# Patient Record
Sex: FEMALE | Race: ASIAN | NOT HISPANIC OR LATINO | URBAN - METROPOLITAN AREA
[De-identification: names, ages, dates, MRNs, and addresses within clinical notes are randomized per-mention and may not be internally consistent; named-entity substitution may affect disease eponyms.]

---

## 2017-07-25 VITALS
TEMPERATURE: 98 F | HEART RATE: 82 BPM | RESPIRATION RATE: 16 BRPM | WEIGHT: 149.69 LBS | DIASTOLIC BLOOD PRESSURE: 66 MMHG | OXYGEN SATURATION: 99 % | SYSTOLIC BLOOD PRESSURE: 130 MMHG | HEIGHT: 65 IN

## 2017-07-25 NOTE — PATIENT PROFILE ADULT. - PSH
H/O total knee replacement  2001 right  Surgery, elective  Cranial hemmatoma drained 1994 H/O total knee replacement  2001 right  Surgery, elective  Cranial hemmatoma drained 1994  s/p accident

## 2017-07-26 ENCOUNTER — INPATIENT (INPATIENT)
Facility: HOSPITAL | Age: 67
LOS: 4 days | Discharge: HOME HEALTH | DRG: 470 | End: 2017-07-31
Attending: SPECIALIST | Admitting: SPECIALIST
Payer: MEDICARE

## 2017-07-26 DIAGNOSIS — Z41.9 ENCOUNTER FOR PROCEDURE FOR PURPOSES OTHER THAN REMEDYING HEALTH STATE, UNSPECIFIED: Chronic | ICD-10-CM

## 2017-07-26 DIAGNOSIS — Z96.659 PRESENCE OF UNSPECIFIED ARTIFICIAL KNEE JOINT: Chronic | ICD-10-CM

## 2017-07-26 DIAGNOSIS — M19.90 UNSPECIFIED OSTEOARTHRITIS, UNSPECIFIED SITE: ICD-10-CM

## 2017-07-26 LAB
ANION GAP SERPL CALC-SCNC: 11 MMOL/L — SIGNIFICANT CHANGE UP (ref 5–17)
BASE EXCESS BLDA CALC-SCNC: 2.2 MMOL/L — SIGNIFICANT CHANGE UP (ref -2–3)
BUN SERPL-MCNC: 10 MG/DL — SIGNIFICANT CHANGE UP (ref 7–23)
CA-I BLDA-SCNC: 1.17 MMOL/L — SIGNIFICANT CHANGE UP (ref 1.12–1.3)
CALCIUM SERPL-MCNC: 9 MG/DL — SIGNIFICANT CHANGE UP (ref 8.4–10.5)
CHLORIDE SERPL-SCNC: 103 MMOL/L — SIGNIFICANT CHANGE UP (ref 96–108)
CO2 SERPL-SCNC: 25 MMOL/L — SIGNIFICANT CHANGE UP (ref 22–31)
COHGB MFR BLDA: 0.3 % — SIGNIFICANT CHANGE UP
CREAT SERPL-MCNC: 0.5 MG/DL — SIGNIFICANT CHANGE UP (ref 0.5–1.3)
GAS PNL BLDA: SIGNIFICANT CHANGE UP
GLUCOSE SERPL-MCNC: 123 MG/DL — HIGH (ref 70–99)
HCO3 BLDA-SCNC: 28 MMOL/L — SIGNIFICANT CHANGE UP (ref 21–28)
HCT VFR BLD CALC: 33 % — LOW (ref 34.5–45)
HGB BLD-MCNC: 10.7 G/DL — LOW (ref 11.5–15.5)
HGB BLDA-MCNC: 10.6 G/DL — LOW (ref 11.5–15.5)
MCHC RBC-ENTMCNC: 30.8 PG — SIGNIFICANT CHANGE UP (ref 27–34)
MCHC RBC-ENTMCNC: 32.4 G/DL — SIGNIFICANT CHANGE UP (ref 32–36)
MCV RBC AUTO: 95.1 FL — SIGNIFICANT CHANGE UP (ref 80–100)
O2 CT VFR BLDA CALC: SIGNIFICANT CHANGE UP (ref 15–23)
OXYHGB MFR BLDA: 99 % — SIGNIFICANT CHANGE UP (ref 94–100)
PCO2 BLDA: 48 MMHG — HIGH (ref 32–45)
PH BLDA: 7.38 — SIGNIFICANT CHANGE UP (ref 7.35–7.45)
PLATELET # BLD AUTO: 208 K/UL — SIGNIFICANT CHANGE UP (ref 150–400)
PO2 BLDA: 244 MMHG — HIGH (ref 83–108)
POTASSIUM BLDA-SCNC: 3.6 MMOL/L — SIGNIFICANT CHANGE UP (ref 3.5–4.9)
POTASSIUM SERPL-MCNC: 4 MMOL/L — SIGNIFICANT CHANGE UP (ref 3.5–5.3)
POTASSIUM SERPL-SCNC: 4 MMOL/L — SIGNIFICANT CHANGE UP (ref 3.5–5.3)
RBC # BLD: 3.47 M/UL — LOW (ref 3.8–5.2)
RBC # FLD: 12.2 % — SIGNIFICANT CHANGE UP (ref 10.3–16.9)
SAO2 % BLDA: 100 % — SIGNIFICANT CHANGE UP (ref 95–100)
SODIUM BLDA-SCNC: 139 MMOL/L — SIGNIFICANT CHANGE UP (ref 138–146)
SODIUM SERPL-SCNC: 139 MMOL/L — SIGNIFICANT CHANGE UP (ref 135–145)
WBC # BLD: 10.2 K/UL — SIGNIFICANT CHANGE UP (ref 3.8–10.5)
WBC # FLD AUTO: 10.2 K/UL — SIGNIFICANT CHANGE UP (ref 3.8–10.5)

## 2017-07-26 PROCEDURE — 73502 X-RAY EXAM HIP UNI 2-3 VIEWS: CPT | Mod: 26,LT

## 2017-07-26 PROCEDURE — 72170 X-RAY EXAM OF PELVIS: CPT | Mod: 26

## 2017-07-26 RX ORDER — MORPHINE SULFATE 50 MG/1
4 CAPSULE, EXTENDED RELEASE ORAL ONCE
Qty: 0 | Refills: 0 | Status: DISCONTINUED | OUTPATIENT
Start: 2017-07-26 | End: 2017-07-26

## 2017-07-26 RX ORDER — IRBESARTAN 75 MG/1
1 TABLET ORAL
Qty: 0 | Refills: 0 | COMMUNITY

## 2017-07-26 RX ORDER — ACETAMINOPHEN 500 MG
650 TABLET ORAL EVERY 6 HOURS
Qty: 0 | Refills: 0 | Status: DISCONTINUED | OUTPATIENT
Start: 2017-07-26 | End: 2017-07-26

## 2017-07-26 RX ORDER — ACETAMINOPHEN 500 MG
650 TABLET ORAL EVERY 6 HOURS
Qty: 0 | Refills: 0 | Status: DISCONTINUED | OUTPATIENT
Start: 2017-07-26 | End: 2017-07-31

## 2017-07-26 RX ORDER — MORPHINE SULFATE 50 MG/1
2 CAPSULE, EXTENDED RELEASE ORAL
Qty: 0 | Refills: 0 | Status: DISCONTINUED | OUTPATIENT
Start: 2017-07-26 | End: 2017-07-26

## 2017-07-26 RX ORDER — POLYETHYLENE GLYCOL 3350 17 G/17G
17 POWDER, FOR SOLUTION ORAL DAILY
Qty: 0 | Refills: 0 | Status: DISCONTINUED | OUTPATIENT
Start: 2017-07-26 | End: 2017-07-26

## 2017-07-26 RX ORDER — FOLIC ACID 0.8 MG
1 TABLET ORAL DAILY
Qty: 0 | Refills: 0 | Status: DISCONTINUED | OUTPATIENT
Start: 2017-07-26 | End: 2017-07-26

## 2017-07-26 RX ORDER — OXYCODONE HYDROCHLORIDE 5 MG/1
10 TABLET ORAL EVERY 4 HOURS
Qty: 0 | Refills: 0 | Status: DISCONTINUED | OUTPATIENT
Start: 2017-07-26 | End: 2017-07-26

## 2017-07-26 RX ORDER — MAGNESIUM HYDROXIDE 400 MG/1
30 TABLET, CHEWABLE ORAL DAILY
Qty: 0 | Refills: 0 | Status: DISCONTINUED | OUTPATIENT
Start: 2017-07-26 | End: 2017-07-26

## 2017-07-26 RX ORDER — FOLIC ACID 0.8 MG
1 TABLET ORAL DAILY
Qty: 0 | Refills: 0 | Status: DISCONTINUED | OUTPATIENT
Start: 2017-07-26 | End: 2017-07-31

## 2017-07-26 RX ORDER — ATORVASTATIN CALCIUM 80 MG/1
10 TABLET, FILM COATED ORAL AT BEDTIME
Qty: 0 | Refills: 0 | Status: DISCONTINUED | OUTPATIENT
Start: 2017-07-26 | End: 2017-07-26

## 2017-07-26 RX ORDER — SODIUM CHLORIDE 9 MG/ML
1000 INJECTION, SOLUTION INTRAVENOUS
Qty: 0 | Refills: 0 | Status: DISCONTINUED | OUTPATIENT
Start: 2017-07-26 | End: 2017-07-31

## 2017-07-26 RX ORDER — ASPIRIN/CALCIUM CARB/MAGNESIUM 324 MG
325 TABLET ORAL
Qty: 0 | Refills: 0 | Status: DISCONTINUED | OUTPATIENT
Start: 2017-07-26 | End: 2017-07-26

## 2017-07-26 RX ORDER — MAGNESIUM HYDROXIDE 400 MG/1
30 TABLET, CHEWABLE ORAL DAILY
Qty: 0 | Refills: 0 | Status: DISCONTINUED | OUTPATIENT
Start: 2017-07-26 | End: 2017-07-31

## 2017-07-26 RX ORDER — CEFAZOLIN SODIUM 1 G
1000 VIAL (EA) INJECTION EVERY 8 HOURS
Qty: 0 | Refills: 0 | Status: COMPLETED | OUTPATIENT
Start: 2017-07-26 | End: 2017-07-27

## 2017-07-26 RX ORDER — SODIUM CHLORIDE 9 MG/ML
1000 INJECTION, SOLUTION INTRAVENOUS
Qty: 0 | Refills: 0 | Status: DISCONTINUED | OUTPATIENT
Start: 2017-07-26 | End: 2017-07-26

## 2017-07-26 RX ORDER — ASPIRIN/CALCIUM CARB/MAGNESIUM 324 MG
325 TABLET ORAL
Qty: 0 | Refills: 0 | Status: DISCONTINUED | OUTPATIENT
Start: 2017-07-26 | End: 2017-07-31

## 2017-07-26 RX ORDER — PANTOPRAZOLE SODIUM 20 MG/1
40 TABLET, DELAYED RELEASE ORAL DAILY
Qty: 0 | Refills: 0 | Status: DISCONTINUED | OUTPATIENT
Start: 2017-07-26 | End: 2017-07-26

## 2017-07-26 RX ORDER — DOCUSATE SODIUM 100 MG
100 CAPSULE ORAL THREE TIMES A DAY
Qty: 0 | Refills: 0 | Status: DISCONTINUED | OUTPATIENT
Start: 2017-07-26 | End: 2017-07-26

## 2017-07-26 RX ORDER — ATORVASTATIN CALCIUM 80 MG/1
10 TABLET, FILM COATED ORAL AT BEDTIME
Qty: 0 | Refills: 0 | Status: DISCONTINUED | OUTPATIENT
Start: 2017-07-26 | End: 2017-07-31

## 2017-07-26 RX ORDER — FERROUS SULFATE 325(65) MG
325 TABLET ORAL
Qty: 0 | Refills: 0 | Status: DISCONTINUED | OUTPATIENT
Start: 2017-07-26 | End: 2017-07-31

## 2017-07-26 RX ORDER — OXYCODONE HYDROCHLORIDE 5 MG/1
10 TABLET ORAL EVERY 6 HOURS
Qty: 0 | Refills: 0 | Status: DISCONTINUED | OUTPATIENT
Start: 2017-07-26 | End: 2017-07-31

## 2017-07-26 RX ORDER — LOSARTAN POTASSIUM 100 MG/1
100 TABLET, FILM COATED ORAL DAILY
Qty: 0 | Refills: 0 | Status: DISCONTINUED | OUTPATIENT
Start: 2017-07-26 | End: 2017-07-31

## 2017-07-26 RX ORDER — FAMOTIDINE 10 MG/ML
20 INJECTION INTRAVENOUS EVERY 12 HOURS
Qty: 0 | Refills: 0 | Status: DISCONTINUED | OUTPATIENT
Start: 2017-07-26 | End: 2017-07-31

## 2017-07-26 RX ORDER — FERROUS SULFATE 325(65) MG
325 TABLET ORAL
Qty: 0 | Refills: 0 | Status: DISCONTINUED | OUTPATIENT
Start: 2017-07-26 | End: 2017-07-26

## 2017-07-26 RX ORDER — ASCORBIC ACID 60 MG
500 TABLET,CHEWABLE ORAL
Qty: 0 | Refills: 0 | Status: DISCONTINUED | OUTPATIENT
Start: 2017-07-26 | End: 2017-07-31

## 2017-07-26 RX ORDER — POLYETHYLENE GLYCOL 3350 17 G/17G
17 POWDER, FOR SOLUTION ORAL DAILY
Qty: 0 | Refills: 0 | Status: DISCONTINUED | OUTPATIENT
Start: 2017-07-26 | End: 2017-07-31

## 2017-07-26 RX ORDER — ONDANSETRON 8 MG/1
4 TABLET, FILM COATED ORAL EVERY 6 HOURS
Qty: 0 | Refills: 0 | Status: DISCONTINUED | OUTPATIENT
Start: 2017-07-26 | End: 2017-07-31

## 2017-07-26 RX ORDER — OXYCODONE HYDROCHLORIDE 5 MG/1
5 TABLET ORAL EVERY 4 HOURS
Qty: 0 | Refills: 0 | Status: DISCONTINUED | OUTPATIENT
Start: 2017-07-26 | End: 2017-07-31

## 2017-07-26 RX ORDER — ONDANSETRON 8 MG/1
4 TABLET, FILM COATED ORAL EVERY 6 HOURS
Qty: 0 | Refills: 0 | Status: DISCONTINUED | OUTPATIENT
Start: 2017-07-26 | End: 2017-07-26

## 2017-07-26 RX ORDER — MORPHINE SULFATE 50 MG/1
2 CAPSULE, EXTENDED RELEASE ORAL EVERY 4 HOURS
Qty: 0 | Refills: 0 | Status: DISCONTINUED | OUTPATIENT
Start: 2017-07-26 | End: 2017-07-31

## 2017-07-26 RX ORDER — ATORVASTATIN CALCIUM 80 MG/1
1 TABLET, FILM COATED ORAL
Qty: 0 | Refills: 0 | COMMUNITY

## 2017-07-26 RX ORDER — LEVOTHYROXINE SODIUM 125 MCG
75 TABLET ORAL DAILY
Qty: 0 | Refills: 0 | Status: DISCONTINUED | OUTPATIENT
Start: 2017-07-26 | End: 2017-07-31

## 2017-07-26 RX ORDER — SENNA PLUS 8.6 MG/1
2 TABLET ORAL AT BEDTIME
Qty: 0 | Refills: 0 | Status: DISCONTINUED | OUTPATIENT
Start: 2017-07-26 | End: 2017-07-31

## 2017-07-26 RX ORDER — LEVOTHYROXINE SODIUM 125 MCG
1 TABLET ORAL
Qty: 0 | Refills: 0 | COMMUNITY

## 2017-07-26 RX ORDER — ASCORBIC ACID 60 MG
500 TABLET,CHEWABLE ORAL
Qty: 0 | Refills: 0 | Status: DISCONTINUED | OUTPATIENT
Start: 2017-07-26 | End: 2017-07-26

## 2017-07-26 RX ORDER — DOCUSATE SODIUM 100 MG
100 CAPSULE ORAL THREE TIMES A DAY
Qty: 0 | Refills: 0 | Status: DISCONTINUED | OUTPATIENT
Start: 2017-07-26 | End: 2017-07-31

## 2017-07-26 RX ORDER — MORPHINE SULFATE 50 MG/1
2 CAPSULE, EXTENDED RELEASE ORAL EVERY 4 HOURS
Qty: 0 | Refills: 0 | Status: DISCONTINUED | OUTPATIENT
Start: 2017-07-26 | End: 2017-07-26

## 2017-07-26 RX ORDER — LEVOTHYROXINE SODIUM 125 MCG
75 TABLET ORAL DAILY
Qty: 0 | Refills: 0 | Status: DISCONTINUED | OUTPATIENT
Start: 2017-07-26 | End: 2017-07-26

## 2017-07-26 RX ORDER — CEFAZOLIN SODIUM 1 G
2000 VIAL (EA) INJECTION EVERY 8 HOURS
Qty: 0 | Refills: 0 | Status: DISCONTINUED | OUTPATIENT
Start: 2017-07-26 | End: 2017-07-26

## 2017-07-26 RX ORDER — SENNA PLUS 8.6 MG/1
2 TABLET ORAL AT BEDTIME
Qty: 0 | Refills: 0 | Status: DISCONTINUED | OUTPATIENT
Start: 2017-07-26 | End: 2017-07-26

## 2017-07-26 RX ORDER — OXYCODONE HYDROCHLORIDE 5 MG/1
5 TABLET ORAL EVERY 4 HOURS
Qty: 0 | Refills: 0 | Status: DISCONTINUED | OUTPATIENT
Start: 2017-07-26 | End: 2017-07-26

## 2017-07-26 RX ORDER — LOSARTAN POTASSIUM 100 MG/1
100 TABLET, FILM COATED ORAL DAILY
Qty: 0 | Refills: 0 | Status: DISCONTINUED | OUTPATIENT
Start: 2017-07-26 | End: 2017-07-26

## 2017-07-26 RX ADMIN — Medication 100 MILLIGRAM(S): at 22:15

## 2017-07-26 RX ADMIN — MORPHINE SULFATE 2 MILLIGRAM(S): 50 CAPSULE, EXTENDED RELEASE ORAL at 19:36

## 2017-07-26 RX ADMIN — MORPHINE SULFATE 2 MILLIGRAM(S): 50 CAPSULE, EXTENDED RELEASE ORAL at 20:45

## 2017-07-26 RX ADMIN — ATORVASTATIN CALCIUM 10 MILLIGRAM(S): 80 TABLET, FILM COATED ORAL at 22:15

## 2017-07-26 RX ADMIN — Medication 300 MILLIGRAM(S): at 23:25

## 2017-07-26 NOTE — PROGRESS NOTE ADULT - SUBJECTIVE AND OBJECTIVE BOX
Ortho Post Op Check    Procedure: YOVANNY  Surgeon: Barbie  Laterality: L    Pt comfortable without complaints, pain controlled  Denies CP, SOB, N/V, numbness/tingling     Vital Signs Last 24 Hrs  T(C): 36.1 (07-26-17 @ 16:00), Max: 36.1 (07-26-17 @ 16:00)  T(F): 97 (07-26-17 @ 16:00), Max: 97 (07-26-17 @ 16:00)  HR: 68 (07-26-17 @ 17:15) (58 - 70)  BP: 133/57 (07-26-17 @ 17:15) (115/64 - 143/60)  BP(mean): --  RR: 18 (07-26-17 @ 17:15) (16 - 18)  SpO2: 99% (07-26-17 @ 17:15) (99% - 100%)  AVSS    General: Pt Alert and oriented, NAD  DSG C/D/I  Pulses: DP/PT  Sensation: SILT   Motor: EHL/FHL/TA/GS                          10.7   10.2  )-----------( 208      ( 26 Jul 2017 16:35 )             33.0   26 Jul 2017 16:35    139    |  103    |  10     ----------------------------<  123    4.0     |  25     |  0.50     Ca    9.0        26 Jul 2017 16:35        Post-op X-Ray: Implant in good position    A/P: 67yFemale POD#0 s/p L YOVANNY Post [place]  - Stable  - Pain Control  - DVT ppx: ASA  - Post op abx: Ancef  - PT, WBS: WBAT    Ortho Pager [phone]

## 2017-07-26 NOTE — DISCHARGE NOTE ADULT - NS AS ACTIVITY OBS
No Heavy lifting/straining No Heavy lifting/straining/Do not make important decisions/Do not drive or operate machinery/Showering allowed

## 2017-07-26 NOTE — DISCHARGE NOTE ADULT - HOSPITAL COURSE
Admit  OR  Periop Antibx  DVT ppx  PT   Pain mgt Admit 7/26  OR 7/26- L YOVANNY posterior approach  Periop Antibx  DVT ppx  PT /OT  Pain mgt Admit 7/26  OR 7/26- L YOVANNY posterior approach  Periop Antibx  DVT ppx  PT /OT  Pain mgt   blood cx ngtd

## 2017-07-26 NOTE — DISCHARGE NOTE ADULT - PATIENT PORTAL LINK FT
“You can access the [employer] Patient Portal, offered by [facility], by registering with the following website: [email or web address]”

## 2017-07-26 NOTE — DISCHARGE NOTE ADULT - CARE PROVIDER_API CALL
Mohinder Valentin), Orthopaedic Surgery  [address] [place] [address]  Phone: [phone]  Fax: [phone]

## 2017-07-26 NOTE — DISCHARGE NOTE ADULT - CARE PLAN
Principal Discharge DX:	Arthritis  Goal:	improvement after surgery  Instructions for follow-up, activity and diet:	No strenuous activity, heavy lifting, driving, tub bathing, or returning to work until cleared by MD.  You may shower--dressing is waterproof.  Remove dressing after post op day 5, then leave incision open to air.  Follow up with Dr. Valentin, call office to schedule an appt within 10-14 days.  If you don't have a bowel movement by post op day 3, then take Milk of Magnesia (over the counter).  If no bowel movement by at least post op day 5, then use a Dulcolax suppository (over the counter) and/or a Fleets enema--if still no bowel movement, call your MD.  Contact your doctor if you experience: fever greater than 101.5, chills, chest pain, difficulty breathing, bleeding, redness or heat around the incision. Principal Discharge DX:	Arthritis  Goal:	improvement after surgery  Instructions for follow-up, activity and diet:	Weight bearing as tolerated on left leg with assistive device (walker).  Posterior hip precautions-- keep hip abductor pillow between legs when laying down to prevent hip dislocation.  No strenuous activity, heavy lifting, driving, tub bathing, or returning to work until cleared by MD.  You may shower--dressing is waterproof.  Remove dressing after post op day 7, then leave incision open to air.  Follow up with Dr. Valentin, call office to schedule an appt within 10-14 days.  If you don't have a bowel movement by post op day 3, then take Milk of Magnesia (over the counter).  If no bowel movement by at least post op day 5, then use a Dulcolax suppository (over the counter) and/or a Fleets enema--if still no bowel movement, call your MD.  Contact your doctor if you experience: fever greater than 101.5, chills, chest pain, difficulty breathing, bleeding, redness or heat around the incision.

## 2017-07-26 NOTE — DISCHARGE NOTE ADULT - PLAN OF CARE
improvement after surgery No strenuous activity, heavy lifting, driving, tub bathing, or returning to work until cleared by MD.  You may shower--dressing is waterproof.  Remove dressing after post op day 5, then leave incision open to air.  Follow up with Dr. Valentin, call office to schedule an appt within 10-14 days.  If you don't have a bowel movement by post op day 3, then take Milk of Magnesia (over the counter).  If no bowel movement by at least post op day 5, then use a Dulcolax suppository (over the counter) and/or a Fleets enema--if still no bowel movement, call your MD.  Contact your doctor if you experience: fever greater than 101.5, chills, chest pain, difficulty breathing, bleeding, redness or heat around the incision. Weight bearing as tolerated on left leg with assistive device (walker).  Posterior hip precautions-- keep hip abductor pillow between legs when laying down to prevent hip dislocation.  No strenuous activity, heavy lifting, driving, tub bathing, or returning to work until cleared by MD.  You may shower--dressing is waterproof.  Remove dressing after post op day 7, then leave incision open to air.  Follow up with Dr. Valentin, call office to schedule an appt within 10-14 days.  If you don't have a bowel movement by post op day 3, then take Milk of Magnesia (over the counter).  If no bowel movement by at least post op day 5, then use a Dulcolax suppository (over the counter) and/or a Fleets enema--if still no bowel movement, call your MD.  Contact your doctor if you experience: fever greater than 101.5, chills, chest pain, difficulty breathing, bleeding, redness or heat around the incision.

## 2017-07-26 NOTE — H&P ADULT - NSHPPHYSICALEXAM_GEN_ALL_CORE
PE: Normal head, atraumatic. Normal skin, no rashes/lesions/erythema.  MSK: Decreased ROM secondary to pain to left hip.   Rest of PE per medical clearance. PE: Normal head, atraumatic. Normal skin, no rashes/lesions/erythema.  MSK: Decreased ROM secondary to pain to left hip. No deformity or open wounds. No rashes or lesions. EHL/TA/GS/FHL 5/5 BLE. Sensation intact and equal BLE. Skin warm and well perfused. DP palpable BLE. Cap refill brisk.   Rest of PE per medical clearance.

## 2017-07-26 NOTE — H&P ADULT - NSHPLABSRESULTS_GEN_ALL_CORE
Preop CBC, BMP, PT/PTT/INR, UA within normal limits- reviewed by medical clearance.   Preop EKG: NSR, reviewed by medical clearance.

## 2017-07-26 NOTE — DISCHARGE NOTE ADULT - MEDICATION SUMMARY - MEDICATIONS TO STOP TAKING
I will STOP taking the medications listed below when I get home from the hospital:    Glucosamine Chondroitin Advanced oral tablet  --  by mouth

## 2017-07-26 NOTE — H&P ADULT - HISTORY OF PRESENT ILLNESS
67F with left hip pain x  Denies numbness/tingling/paresthesias to BLE.  Presents today for left YOVANNY with Dr. Valentin. 67F with left hip pain x 1 month without accident or injury. Denies numbness/tingling/paresthesias to BLE. Ambulates with a cane for last 1 month due to pain. Denies hx of DVT.   Presents today for left YOVANNY with Dr. Valentin.

## 2017-07-26 NOTE — DISCHARGE NOTE ADULT - MEDICATION SUMMARY - MEDICATIONS TO TAKE
I will START or STAY ON the medications listed below when I get home from the hospital:    aspirin 325 mg oral delayed release tablet  -- 1 tab(s) by mouth 2 times a day  Take for 4 weeks after surgery to prevent blood clots.  OVER THE COUNTER MEDICINE  -- Indication: For prevent blood clot    acetaminophen-oxycodone 325 mg-5 mg oral tablet  -- 1 to 2 tab(s) by mouth every 6 hours, as needed, pain MDD:6  -- Caution federal law prohibits the transfer of this drug to any person other  than the person for whom it was prescribed.  May cause drowsiness.  Alcohol may intensify this effect.  Use care when operating dangerous machinery.  This prescription cannot be refilled.  This product contains acetaminophen.  Do not use  with any other product containing acetaminophen to prevent possible liver damage.  Using more of this medication than prescribed may cause serious breathing problems.    -- Indication: For moderate to severe pain    irbesartan 300 mg oral tablet  -- 1 tab(s) by mouth once a day  -- Indication: For Hypertension    phenytoin 300 mg oral capsule, extended release  -- 1 cap(s) by mouth once a day  -- Indication: For Home medicine    Lipitor 10 mg oral tablet  -- 1 tab(s) by mouth once a day  -- Indication: For cholesterol medicine    bisacodyl 10 mg rectal suppository  -- 1 suppository(ies) rectally once a day, As needed, If no bowel movement by POD#2  -- Indication: For constipation    docusate sodium 100 mg oral capsule  -- 1 cap(s) by mouth 3 times a day  -- Indication: For constipation    polyethylene glycol 3350 oral powder for reconstitution  -- 17 gram(s) by mouth once a day  -- Indication: For constipation    senna oral tablet  -- 2 tab(s) by mouth once a day (at bedtime), As needed, Constipation  -- Indication: For constipation    ciprofloxacin 500 mg oral tablet  -- 1 tab(s) by mouth every 12 hours  -- Avoid prolonged or excessive exposure to direct and/or artificial sunlight while taking this medication.  Check with your doctor before becoming pregnant.  Do not take dairy products, antacids, or iron preparations within one hour of this medication.  Finish all this medication unless otherwise directed by prescriber.  Medication should be taken with plenty of water.    -- Indication: For prevent infection    levothyroxine 75 mcg (0.075 mg) oral capsule  -- 1 cap(s) by mouth once a day  -- Indication: For thyroid disorder    folic acid 1 mg oral tablet  -- 1 tab(s) by mouth once a day  -- Indication: For Supplement

## 2017-07-26 NOTE — H&P ADULT - PSH
H/O total knee replacement  2001 right  Surgery, elective  Cranial hemmatoma drained 1994  s/p accident

## 2017-07-27 LAB
ANION GAP SERPL CALC-SCNC: 13 MMOL/L — SIGNIFICANT CHANGE UP (ref 5–17)
BUN SERPL-MCNC: 12 MG/DL — SIGNIFICANT CHANGE UP (ref 7–23)
CALCIUM SERPL-MCNC: 9 MG/DL — SIGNIFICANT CHANGE UP (ref 8.4–10.5)
CHLORIDE SERPL-SCNC: 97 MMOL/L — SIGNIFICANT CHANGE UP (ref 96–108)
CO2 SERPL-SCNC: 24 MMOL/L — SIGNIFICANT CHANGE UP (ref 22–31)
CREAT SERPL-MCNC: 0.5 MG/DL — SIGNIFICANT CHANGE UP (ref 0.5–1.3)
GLUCOSE SERPL-MCNC: 110 MG/DL — HIGH (ref 70–99)
HCT VFR BLD CALC: 32.9 % — LOW (ref 34.5–45)
HGB BLD-MCNC: 11.3 G/DL — LOW (ref 11.5–15.5)
MCHC RBC-ENTMCNC: 31.3 PG — SIGNIFICANT CHANGE UP (ref 27–34)
MCHC RBC-ENTMCNC: 34.3 G/DL — SIGNIFICANT CHANGE UP (ref 32–36)
MCV RBC AUTO: 91.1 FL — SIGNIFICANT CHANGE UP (ref 80–100)
METHGB MFR BLDA: 0 % — SIGNIFICANT CHANGE UP
MRSA PCR RESULT.: NEGATIVE — SIGNIFICANT CHANGE UP
PLATELET # BLD AUTO: 209 K/UL — SIGNIFICANT CHANGE UP (ref 150–400)
POTASSIUM SERPL-MCNC: 4.6 MMOL/L — SIGNIFICANT CHANGE UP (ref 3.5–5.3)
POTASSIUM SERPL-SCNC: 4.6 MMOL/L — SIGNIFICANT CHANGE UP (ref 3.5–5.3)
RBC # BLD: 3.61 M/UL — LOW (ref 3.8–5.2)
RBC # FLD: 12.6 % — SIGNIFICANT CHANGE UP (ref 10.3–16.9)
S AUREUS DNA NOSE QL NAA+PROBE: POSITIVE
SODIUM SERPL-SCNC: 134 MMOL/L — LOW (ref 135–145)
WBC # BLD: 9.3 K/UL — SIGNIFICANT CHANGE UP (ref 3.8–10.5)
WBC # FLD AUTO: 9.3 K/UL — SIGNIFICANT CHANGE UP (ref 3.8–10.5)

## 2017-07-27 RX ADMIN — Medication 650 MILLIGRAM(S): at 20:29

## 2017-07-27 RX ADMIN — OXYCODONE HYDROCHLORIDE 10 MILLIGRAM(S): 5 TABLET ORAL at 13:22

## 2017-07-27 RX ADMIN — Medication 325 MILLIGRAM(S): at 18:52

## 2017-07-27 RX ADMIN — Medication 1 TABLET(S): at 13:21

## 2017-07-27 RX ADMIN — Medication 75 MICROGRAM(S): at 05:45

## 2017-07-27 RX ADMIN — FAMOTIDINE 20 MILLIGRAM(S): 10 INJECTION INTRAVENOUS at 18:52

## 2017-07-27 RX ADMIN — Medication 200 MILLIGRAM(S): at 22:00

## 2017-07-27 RX ADMIN — OXYCODONE HYDROCHLORIDE 10 MILLIGRAM(S): 5 TABLET ORAL at 18:54

## 2017-07-27 RX ADMIN — Medication 100 MILLIGRAM(S): at 22:00

## 2017-07-27 RX ADMIN — Medication 325 MILLIGRAM(S): at 05:45

## 2017-07-27 RX ADMIN — OXYCODONE HYDROCHLORIDE 10 MILLIGRAM(S): 5 TABLET ORAL at 05:45

## 2017-07-27 RX ADMIN — Medication 325 MILLIGRAM(S): at 13:21

## 2017-07-27 RX ADMIN — Medication 1 TABLET(S): at 22:00

## 2017-07-27 RX ADMIN — Medication 1 MILLIGRAM(S): at 13:21

## 2017-07-27 RX ADMIN — Medication 500 MILLIGRAM(S): at 18:52

## 2017-07-27 RX ADMIN — OXYCODONE HYDROCHLORIDE 10 MILLIGRAM(S): 5 TABLET ORAL at 06:45

## 2017-07-27 RX ADMIN — POLYETHYLENE GLYCOL 3350 17 GRAM(S): 17 POWDER, FOR SOLUTION ORAL at 13:21

## 2017-07-27 RX ADMIN — ATORVASTATIN CALCIUM 10 MILLIGRAM(S): 80 TABLET, FILM COATED ORAL at 22:00

## 2017-07-27 RX ADMIN — Medication 100 MILLIGRAM(S): at 13:21

## 2017-07-27 RX ADMIN — FAMOTIDINE 20 MILLIGRAM(S): 10 INJECTION INTRAVENOUS at 05:45

## 2017-07-27 RX ADMIN — Medication 100 MILLIGRAM(S): at 05:45

## 2017-07-27 NOTE — PROGRESS NOTE ADULT - SUBJECTIVE AND OBJECTIVE BOX
SUBJECTIVE: Patient seen and examined.  No issues overnight. Pain well controlled. Denies CP/SOB    OBJECTIVE:     Vital Signs Last 24 Hrs  T(C): 36.6 (27 Jul 2017 05:41), Max: 36.6 (27 Jul 2017 05:41)  T(F): 97.8 (27 Jul 2017 05:41), Max: 97.8 (27 Jul 2017 05:41)  HR: 76 (27 Jul 2017 05:41) (58 - 76)  BP: 105/64 (27 Jul 2017 05:41) (105/64 - 143/60)  BP(mean): --  RR: 16 (27 Jul 2017 05:41) (12 - 18)  SpO2: 95% (27 Jul 2017 05:41) (95% - 100%)                 Dressing: clean/dry/intact            Motor exam:  TA 5/5 EHL 5/5 GSC 5/5          Sensation:   T SILT SPH SILT DP SILT         Vascular:  Warm/pink/well perfused             LABS:                        10.7   10.2  )-----------( 208      ( 26 Jul 2017 16:35 )             33.0     07-26    139  |  103  |  10  ----------------------------<  123<H>  4.0   |  25  |  0.50    Ca    9.0      26 Jul 2017 16:35            MEDICATIONS:  MEDICATIONS  (STANDING):  losartan 100 milliGRAM(s) Oral daily  phenytoin   Capsule 300 milliGRAM(s) Oral daily  atorvastatin 10 milliGRAM(s) Oral at bedtime  levothyroxine 75 MICROGram(s) Oral daily  lactated ringers. 1000 milliLiter(s) (120 mL/Hr) IV Continuous <Continuous>  aspirin enteric coated 325 milliGRAM(s) Oral two times a day  calcium carbonate 1250 mG + Vitamin D (OsCal 500 + D) 1 Tablet(s) Oral three times a day  famotidine    Tablet 20 milliGRAM(s) Oral every 12 hours  polyethylene glycol 3350 17 Gram(s) Oral daily  docusate sodium 100 milliGRAM(s) Oral three times a day  ferrous    sulfate 325 milliGRAM(s) Oral three times a day with meals  folic acid 1 milliGRAM(s) Oral daily  multivitamin 1 Tablet(s) Oral daily  ascorbic acid 500 milliGRAM(s) Oral two times a day    MEDICATIONS  (PRN):  oxyCODONE    IR 5 milliGRAM(s) Oral every 4 hours PRN Moderate Pain (4 - 6)  oxyCODONE    IR 10 milliGRAM(s) Oral every 6 hours PRN Severe Pain (7 - 10)  morphine  - Injectable 2 milliGRAM(s) IV Push every 4 hours PRN Breakthrough pain unrelieved by PO meds  aluminum hydroxide/magnesium hydroxide/simethicone Suspension 30 milliLiter(s) Oral four times a day PRN Indigestion  ondansetron Injectable 4 milliGRAM(s) IV Push every 6 hours PRN Nausea and/or Vomiting  senna 2 Tablet(s) Oral at bedtime PRN Constipation  magnesium hydroxide Suspension 30 milliLiter(s) Oral daily PRN Constipation unrelieved by Senna  acetaminophen   Tablet 650 milliGRAM(s) Oral every 6 hours PRN For Mild pain or Temp over 38.3 C (100.94 F)      Anticoagulation:  aspirin enteric coated 325 milliGRAM(s) Oral two times a day      Antibiotics:       Pain medications:   phenytoin   Capsule 300 milliGRAM(s) Oral daily  oxyCODONE    IR 5 milliGRAM(s) Oral every 4 hours PRN  oxyCODONE    IR 10 milliGRAM(s) Oral every 6 hours PRN  morphine  - Injectable 2 milliGRAM(s) IV Push every 4 hours PRN  ondansetron Injectable 4 milliGRAM(s) IV Push every 6 hours PRN  acetaminophen   Tablet 650 milliGRAM(s) Oral every 6 hours PRN        A/P : 67f  s/p  L purvi  -    Pain control  -    DVT ppx: aspirin enteric coated 325 milliGRAM(s) Oral two times a day  -    ADAT  -    Check AM labs  -    Weight bearing status:  wbat  -    Resume home meds  -    Physical Therapy  -    Dispo: home  -    posterior hip precautions

## 2017-07-27 NOTE — PHYSICAL THERAPY INITIAL EVALUATION ADULT - CRITERIA FOR SKILLED THERAPEUTIC INTERVENTIONS
anticipated equipment needs at discharge/rehab potential/therapy frequency/impairments found/anticipated discharge recommendation

## 2017-07-27 NOTE — PHYSICAL THERAPY INITIAL EVALUATION ADULT - ACTIVE RANGE OF MOTION EXAMINATION, REHAB EVAL
bilateral upper extremity Active ROM was WFL (within functional limits)/bilateral  lower extremity Active ROM was WFL (within functional limits)/left hip motion WFL within hip precautions, however motion slow due to pain.

## 2017-07-27 NOTE — OCCUPATIONAL THERAPY INITIAL EVALUATION ADULT - PERTINENT HX OF CURRENT PROBLEM, REHAB EVAL
Patient underwent left total hip arthroplasty on 07/26/2017. Patient underwent left total hip arthroplasty (posterior approach) on 07/26/2017.

## 2017-07-27 NOTE — OCCUPATIONAL THERAPY INITIAL EVALUATION ADULT - MD ORDER
67F with left hip pain x 1 month without accident or injury. Denies numbness/tingling/paresthesias to BLE. Ambulates with a cane for last 1 month due to pain. Denies hx of DVT.   Presents today for left YOVANNY with Dr. Valentin.

## 2017-07-27 NOTE — OCCUPATIONAL THERAPY INITIAL EVALUATION ADULT - GENERAL OBSERVATIONS, REHAB EVAL
Right hand dominant. Chart reviewed, patient cleared for OT eval by DANETTE Garza, received semi-supine, NAD, +SCDs, +abduction pillow, +IV.

## 2017-07-27 NOTE — OCCUPATIONAL THERAPY INITIAL EVALUATION ADULT - PLANNED THERAPY INTERVENTIONS, OT EVAL
bed mobility training/transfer training/ADL retraining/balance training/strengthening/IADL retraining

## 2017-07-27 NOTE — PHYSICAL THERAPY INITIAL EVALUATION ADULT - ADDITIONAL COMMENTS
Patient states that she lives in an elevator building (no stairs) with her boyfriend who will be available to assist upon d/c. Has cane at home, no rolling walker or commode.

## 2017-07-27 NOTE — PROGRESS NOTE ADULT - SUBJECTIVE AND OBJECTIVE BOX
POST OPERATIVE DAY #1 left YOVANNY                     SUBJECTIVE: Patient seen and examined. No complaints, worked with PT this AM and doing well. Denies numbness/tingling/paresthesias to BLE. Denies fevers, chills, n/v/cp/sob.   Pain well controlled      OBJECTIVE:     Vital Signs Last 24 Hrs  T(C): 36.6 (27 Jul 2017 05:41), Max: 36.6 (27 Jul 2017 05:41)  T(F): 97.8 (27 Jul 2017 05:41), Max: 97.8 (27 Jul 2017 05:41)  HR: 76 (27 Jul 2017 05:41) (58 - 76)  BP: 105/64 (27 Jul 2017 05:41) (105/64 - 143/60)  BP(mean): --  RR: 16 (27 Jul 2017 05:41) (12 - 18)  SpO2: 95% (27 Jul 2017 05:41) (95% - 100%)    LLE:          Dressing: clean/dry/intact          Sensation: intact to light touch BLE         Motor exam:   5/5 ehl/ta/gs/fhl BLE. DP palpable BLE.         warm, well-perfused; capillary refill < 3 seconds              I&O's Detail    26 Jul 2017 07:01  -  27 Jul 2017 07:00  --------------------------------------------------------  IN:  Total IN: 0 mL    OUT:    Voided: 700 mL  Total OUT: 700 mL    Total NET: -700 mL      27 Jul 2017 07:01  -  27 Jul 2017 11:09  --------------------------------------------------------  IN:    Oral Fluid: 360 mL  Total IN: 360 mL    OUT:    Voided: 750 mL  Total OUT: 750 mL    Total NET: -390 mL    LABS:                        10.7   10.2  )-----------( 208      ( 26 Jul 2017 16:35 )             33.0     07-26    139  |  103  |  10  ----------------------------<  123<H>  4.0   |  25  |  0.50    Ca    9.0      26 Jul 2017 16:35      MEDICATIONS:    phenytoin   Capsule 300 milliGRAM(s) Oral daily  oxyCODONE    IR 5 milliGRAM(s) Oral every 4 hours PRN  oxyCODONE    IR 10 milliGRAM(s) Oral every 6 hours PRN  morphine  - Injectable 2 milliGRAM(s) IV Push every 4 hours PRN  ondansetron Injectable 4 milliGRAM(s) IV Push every 6 hours PRN  acetaminophen   Tablet 650 milliGRAM(s) Oral every 6 hours PRN    aspirin enteric coated 325 milliGRAM(s) Oral two times a day      ASSESSMENT AND PLAN:     1. Analgesic pain control  2. DVT prophylaxis: ASA  3. Will follow up PT/OT recommendations      4. Weight Bearing status: WBAT  5. Dispo: pending PT evaluation

## 2017-07-28 LAB
APPEARANCE UR: CLEAR — SIGNIFICANT CHANGE UP
BILIRUB UR-MCNC: NEGATIVE — SIGNIFICANT CHANGE UP
COLOR SPEC: YELLOW — SIGNIFICANT CHANGE UP
DIFF PNL FLD: (no result)
GLUCOSE UR QL: NEGATIVE — SIGNIFICANT CHANGE UP
KETONES UR-MCNC: NEGATIVE — SIGNIFICANT CHANGE UP
LEUKOCYTE ESTERASE UR-ACNC: (no result)
NITRITE UR-MCNC: NEGATIVE — SIGNIFICANT CHANGE UP
PH UR: 6 — SIGNIFICANT CHANGE UP (ref 5–8)
PROT UR-MCNC: NEGATIVE MG/DL — SIGNIFICANT CHANGE UP
SP GR SPEC: <=1.005 — SIGNIFICANT CHANGE UP (ref 1–1.03)
UROBILINOGEN FLD QL: 0.2 E.U./DL — SIGNIFICANT CHANGE UP

## 2017-07-28 PROCEDURE — 71010: CPT | Mod: 26

## 2017-07-28 RX ORDER — POLYETHYLENE GLYCOL 3350 17 G/17G
17 POWDER, FOR SOLUTION ORAL
Qty: 0 | Refills: 0 | COMMUNITY
Start: 2017-07-28

## 2017-07-28 RX ORDER — FOLIC ACID 0.8 MG
1 TABLET ORAL
Qty: 0 | Refills: 0 | COMMUNITY
Start: 2017-07-28

## 2017-07-28 RX ORDER — SODIUM CHLORIDE 9 MG/ML
1000 INJECTION INTRAMUSCULAR; INTRAVENOUS; SUBCUTANEOUS
Qty: 0 | Refills: 0 | Status: DISCONTINUED | OUTPATIENT
Start: 2017-07-28 | End: 2017-07-31

## 2017-07-28 RX ORDER — ASPIRIN/CALCIUM CARB/MAGNESIUM 324 MG
1 TABLET ORAL
Qty: 0 | Refills: 0 | COMMUNITY
Start: 2017-07-28

## 2017-07-28 RX ORDER — SODIUM CHLORIDE 9 MG/ML
500 INJECTION INTRAMUSCULAR; INTRAVENOUS; SUBCUTANEOUS ONCE
Qty: 0 | Refills: 0 | Status: COMPLETED | OUTPATIENT
Start: 2017-07-28 | End: 2017-07-28

## 2017-07-28 RX ORDER — DOCUSATE SODIUM 100 MG
1 CAPSULE ORAL
Qty: 0 | Refills: 0 | COMMUNITY
Start: 2017-07-28

## 2017-07-28 RX ORDER — SENNA PLUS 8.6 MG/1
2 TABLET ORAL
Qty: 0 | Refills: 0 | COMMUNITY
Start: 2017-07-28

## 2017-07-28 RX ADMIN — SODIUM CHLORIDE 1000 MILLILITER(S): 9 INJECTION INTRAMUSCULAR; INTRAVENOUS; SUBCUTANEOUS at 11:48

## 2017-07-28 RX ADMIN — SENNA PLUS 2 TABLET(S): 8.6 TABLET ORAL at 21:40

## 2017-07-28 RX ADMIN — Medication 325 MILLIGRAM(S): at 11:45

## 2017-07-28 RX ADMIN — Medication 1 TABLET(S): at 11:45

## 2017-07-28 RX ADMIN — Medication 100 MILLIGRAM(S): at 11:45

## 2017-07-28 RX ADMIN — Medication 325 MILLIGRAM(S): at 05:27

## 2017-07-28 RX ADMIN — Medication 200 MILLIGRAM(S): at 21:40

## 2017-07-28 RX ADMIN — Medication 100 MILLIGRAM(S): at 21:40

## 2017-07-28 RX ADMIN — FAMOTIDINE 20 MILLIGRAM(S): 10 INJECTION INTRAVENOUS at 17:22

## 2017-07-28 RX ADMIN — Medication 1 TABLET(S): at 05:27

## 2017-07-28 RX ADMIN — Medication 325 MILLIGRAM(S): at 17:22

## 2017-07-28 RX ADMIN — OXYCODONE HYDROCHLORIDE 10 MILLIGRAM(S): 5 TABLET ORAL at 06:00

## 2017-07-28 RX ADMIN — Medication 1 MILLIGRAM(S): at 11:45

## 2017-07-28 RX ADMIN — OXYCODONE HYDROCHLORIDE 10 MILLIGRAM(S): 5 TABLET ORAL at 05:27

## 2017-07-28 RX ADMIN — Medication 100 MILLIGRAM(S): at 05:27

## 2017-07-28 RX ADMIN — ATORVASTATIN CALCIUM 10 MILLIGRAM(S): 80 TABLET, FILM COATED ORAL at 21:41

## 2017-07-28 RX ADMIN — Medication 650 MILLIGRAM(S): at 17:22

## 2017-07-28 RX ADMIN — Medication 500 MILLIGRAM(S): at 05:27

## 2017-07-28 RX ADMIN — Medication 1 TABLET(S): at 11:46

## 2017-07-28 RX ADMIN — Medication 75 MICROGRAM(S): at 04:48

## 2017-07-28 RX ADMIN — Medication 1 TABLET(S): at 21:40

## 2017-07-28 RX ADMIN — POLYETHYLENE GLYCOL 3350 17 GRAM(S): 17 POWDER, FOR SOLUTION ORAL at 11:45

## 2017-07-28 RX ADMIN — FAMOTIDINE 20 MILLIGRAM(S): 10 INJECTION INTRAVENOUS at 05:27

## 2017-07-28 RX ADMIN — OXYCODONE HYDROCHLORIDE 10 MILLIGRAM(S): 5 TABLET ORAL at 13:52

## 2017-07-28 RX ADMIN — Medication 500 MILLIGRAM(S): at 17:22

## 2017-07-28 NOTE — PROGRESS NOTE ADULT - SUBJECTIVE AND OBJECTIVE BOX
SUBJECTIVE: Patient seen and examined.  No issues overnight. Pain well controlled. Denies CP/SOB    OBJECTIVE:     Vital Signs Last 24 Hrs  T(C): 37.6 (28 Jul 2017 04:47), Max: 39.2 (27 Jul 2017 20:16)  T(F): 99.6 (28 Jul 2017 04:47), Max: 102.6 (27 Jul 2017 20:16)  HR: 87 (28 Jul 2017 04:47) (78 - 105)  BP: 111/66 (28 Jul 2017 04:47) (100/56 - 132/63)  BP(mean): --  RR: 16 (28 Jul 2017 04:47) (15 - 16)  SpO2: 95% (28 Jul 2017 04:47) (95% - 98%)                  Dressing: clean/dry/intact            Motor exam:  TA 5/5 EHL 5/5 GSC 5/5          Sensation:   T SILT SPH SILT DP SILT         Vascular:  Warm/pink/well perfused             LABS:                        11.3   9.3   )-----------( 209      ( 27 Jul 2017 11:22 )             32.9     07-27    134<L>  |  97  |  12  ----------------------------<  110<H>  4.6   |  24  |  0.50    Ca    9.0      27 Jul 2017 11:22            MEDICATIONS:  MEDICATIONS  (STANDING):  losartan 100 milliGRAM(s) Oral daily  atorvastatin 10 milliGRAM(s) Oral at bedtime  levothyroxine 75 MICROGram(s) Oral daily  lactated ringers. 1000 milliLiter(s) (120 mL/Hr) IV Continuous <Continuous>  aspirin enteric coated 325 milliGRAM(s) Oral two times a day  calcium carbonate 1250 mG + Vitamin D (OsCal 500 + D) 1 Tablet(s) Oral three times a day  famotidine    Tablet 20 milliGRAM(s) Oral every 12 hours  polyethylene glycol 3350 17 Gram(s) Oral daily  docusate sodium 100 milliGRAM(s) Oral three times a day  ferrous    sulfate 325 milliGRAM(s) Oral three times a day with meals  folic acid 1 milliGRAM(s) Oral daily  multivitamin 1 Tablet(s) Oral daily  ascorbic acid 500 milliGRAM(s) Oral two times a day  phenytoin   Capsule 200 milliGRAM(s) Oral daily    MEDICATIONS  (PRN):  oxyCODONE    IR 5 milliGRAM(s) Oral every 4 hours PRN Moderate Pain (4 - 6)  oxyCODONE    IR 10 milliGRAM(s) Oral every 6 hours PRN Severe Pain (7 - 10)  morphine  - Injectable 2 milliGRAM(s) IV Push every 4 hours PRN Breakthrough pain unrelieved by PO meds  aluminum hydroxide/magnesium hydroxide/simethicone Suspension 30 milliLiter(s) Oral four times a day PRN Indigestion  ondansetron Injectable 4 milliGRAM(s) IV Push every 6 hours PRN Nausea and/or Vomiting  bisacodyl Suppository 10 milliGRAM(s) Rectal daily PRN If no bowel movement by POD#2  senna 2 Tablet(s) Oral at bedtime PRN Constipation  magnesium hydroxide Suspension 30 milliLiter(s) Oral daily PRN Constipation unrelieved by Senna  acetaminophen   Tablet 650 milliGRAM(s) Oral every 6 hours PRN For Mild pain or Temp over 38.3 C (100.94 F)      Anticoagulation:  aspirin enteric coated 325 milliGRAM(s) Oral two times a day      Antibiotics:       Pain medications:   oxyCODONE    IR 5 milliGRAM(s) Oral every 4 hours PRN  oxyCODONE    IR 10 milliGRAM(s) Oral every 6 hours PRN  morphine  - Injectable 2 milliGRAM(s) IV Push every 4 hours PRN  ondansetron Injectable 4 milliGRAM(s) IV Push every 6 hours PRN  acetaminophen   Tablet 650 milliGRAM(s) Oral every 6 hours PRN  phenytoin   Capsule 200 milliGRAM(s) Oral daily        A/P :  67f s/p   l purvi, posterior approach  -    Pain control  -    DVT ppx: aspirin enteric coated 325 milliGRAM(s) Oral two times a day  -    ADAT  -    Check AM labs  -    Weight bearing status:  wbat lle  -    Resume home meds  -    Physical Therapy  -    Dispo: home

## 2017-07-28 NOTE — PROGRESS NOTE ADULT - SUBJECTIVE AND OBJECTIVE BOX
ORTHO NOTE    [x ] Pt seen/examined.  [ ] Pt without any complaints/in NAD.    [x ] Pt complains of: "I was shaking"      ROS: [x ] Fever  [ ] Chills  [ ] CP [ ] SOB [ ] Dysnea  [ ] Palpitations [ ] Cough [ ] N/V/C/D [ ] Paresthia [ ] Other     [ ] ROS  otherwise negative    .Fever 102.6 at 2016 on 7/27. Denies CP, SOB, palpitations    PHYSICAL EXAM:    Vital Signs Last 24 Hrs  T(C): 37.9 (28 Jul 2017 08:44), Max: 39.2 (27 Jul 2017 20:16)  T(F): 100.3 (28 Jul 2017 08:44), Max: 102.6 (27 Jul 2017 20:16)  HR: 89 (28 Jul 2017 08:44) (83 - 105)  BP: 102/53 (28 Jul 2017 08:44) (102/53 - 132/63)  BP(mean): --  RR: 16 (28 Jul 2017 08:44) (15 - 16)  SpO2: 96% (28 Jul 2017 08:44) (95% - 98%)    I&O's Detail    27 Jul 2017 07:01  -  28 Jul 2017 07:00  --------------------------------------------------------  IN:    Oral Fluid: 720 mL  Total IN: 720 mL    OUT:    Voided: 1610 mL  Total OUT: 1610 mL    Total NET: -890 mL      28 Jul 2017 07:01  -  28 Jul 2017 11:41  --------------------------------------------------------  IN:    Oral Fluid: 300 mL  Total IN: 300 mL    OUT:    Voided: 850 mL  Total OUT: 850 mL    Total NET: -550 mL           CAPILLARY BLOOD GLUCOSE                      Neuro: AAOX3    Lungs: CTA, IS demonstrated    CV: HR 90s    ABD: soft, nontender    Ext: left hip posterior dsg cdi, L LE NVID strength 5/5, examined OOB in chair adhering to posterior hip precautions    LABS                        11.3   9.3   )-----------( 209      ( 27 Jul 2017 11:22 )             32.9                                07-27    134<L>  |  97  |  12  ----------------------------<  110<H>  4.6   |  24  |  0.50    Ca    9.0      27 Jul 2017 11:22        [ ] Other Labs  [ ] None ordered            Please check or [place] when present:  •  Heart Failure:    [ ] Acute        [ ]  Acute on Chronic        [ ] Chronic         [ ] Diastolic     [ ]  Combined    •  NAINA:     [ ] ATN        [ ]  Renal medullary necrosis       [ ]  Renal cortical necrosis                  [ ] Other pathological Lesion:  •  CKD:  [ ] Stage I   [ ] Stage II  [ ] Stage III    [ ]Stage IV   [ ]  CKD V   [ ]  Other/Unspecified:    •  Abdominal Nutritional Status:   [ ] Malnutrition-See Nutrition note    [ ] Cachexia   [ ]  Other        [ ] Supplement ordered:            [ ] Morbid Obesity: BMI >=40         ASSESSMENT/PLAN:      STATUS POST: L YOVANNY  fever workup- f/u u/a, CXR  If febrile today will draw blood cx  OOB to chair and pushing IS    CONTINUE:          [ ] PT/OT- WBAT, posterior hip precautions    [ ] DVT PPX- scd, ASA bid    [ ] Pain Mgt- po meds    [ ] Dispo plan- home, HPT

## 2017-07-29 LAB
ANION GAP SERPL CALC-SCNC: 9 MMOL/L — SIGNIFICANT CHANGE UP (ref 5–17)
BUN SERPL-MCNC: 10 MG/DL — SIGNIFICANT CHANGE UP (ref 7–23)
CALCIUM SERPL-MCNC: 8.8 MG/DL — SIGNIFICANT CHANGE UP (ref 8.4–10.5)
CHLORIDE SERPL-SCNC: 102 MMOL/L — SIGNIFICANT CHANGE UP (ref 96–108)
CO2 SERPL-SCNC: 28 MMOL/L — SIGNIFICANT CHANGE UP (ref 22–31)
CREAT SERPL-MCNC: 0.5 MG/DL — SIGNIFICANT CHANGE UP (ref 0.5–1.3)
GLUCOSE SERPL-MCNC: 113 MG/DL — HIGH (ref 70–99)
HCT VFR BLD CALC: 26.4 % — LOW (ref 34.5–45)
HGB BLD-MCNC: 8.4 G/DL — LOW (ref 11.5–15.5)
MCHC RBC-ENTMCNC: 30.9 PG — SIGNIFICANT CHANGE UP (ref 27–34)
MCHC RBC-ENTMCNC: 31.8 G/DL — LOW (ref 32–36)
MCV RBC AUTO: 97.1 FL — SIGNIFICANT CHANGE UP (ref 80–100)
PLATELET # BLD AUTO: 187 K/UL — SIGNIFICANT CHANGE UP (ref 150–400)
POTASSIUM SERPL-MCNC: 3.7 MMOL/L — SIGNIFICANT CHANGE UP (ref 3.5–5.3)
POTASSIUM SERPL-SCNC: 3.7 MMOL/L — SIGNIFICANT CHANGE UP (ref 3.5–5.3)
RBC # BLD: 2.72 M/UL — LOW (ref 3.8–5.2)
RBC # FLD: 12.2 % — SIGNIFICANT CHANGE UP (ref 10.3–16.9)
SODIUM SERPL-SCNC: 139 MMOL/L — SIGNIFICANT CHANGE UP (ref 135–145)
WBC # BLD: 7.7 K/UL — SIGNIFICANT CHANGE UP (ref 3.8–10.5)
WBC # FLD AUTO: 7.7 K/UL — SIGNIFICANT CHANGE UP (ref 3.8–10.5)

## 2017-07-29 RX ORDER — CIPROFLOXACIN LACTATE 400MG/40ML
500 VIAL (ML) INTRAVENOUS EVERY 12 HOURS
Qty: 0 | Refills: 0 | Status: DISCONTINUED | OUTPATIENT
Start: 2017-07-29 | End: 2017-07-31

## 2017-07-29 RX ADMIN — Medication 1 TABLET(S): at 13:56

## 2017-07-29 RX ADMIN — Medication 325 MILLIGRAM(S): at 18:22

## 2017-07-29 RX ADMIN — Medication 1 TABLET(S): at 12:17

## 2017-07-29 RX ADMIN — Medication 200 MILLIGRAM(S): at 21:55

## 2017-07-29 RX ADMIN — Medication 500 MILLIGRAM(S): at 07:08

## 2017-07-29 RX ADMIN — Medication 100 MILLIGRAM(S): at 07:08

## 2017-07-29 RX ADMIN — Medication 325 MILLIGRAM(S): at 07:08

## 2017-07-29 RX ADMIN — Medication 500 MILLIGRAM(S): at 18:22

## 2017-07-29 RX ADMIN — Medication 75 MICROGRAM(S): at 05:40

## 2017-07-29 RX ADMIN — Medication 1 TABLET(S): at 21:59

## 2017-07-29 RX ADMIN — OXYCODONE HYDROCHLORIDE 5 MILLIGRAM(S): 5 TABLET ORAL at 13:55

## 2017-07-29 RX ADMIN — OXYCODONE HYDROCHLORIDE 5 MILLIGRAM(S): 5 TABLET ORAL at 14:55

## 2017-07-29 RX ADMIN — FAMOTIDINE 20 MILLIGRAM(S): 10 INJECTION INTRAVENOUS at 07:08

## 2017-07-29 RX ADMIN — ATORVASTATIN CALCIUM 10 MILLIGRAM(S): 80 TABLET, FILM COATED ORAL at 21:59

## 2017-07-29 RX ADMIN — Medication 325 MILLIGRAM(S): at 12:17

## 2017-07-29 RX ADMIN — OXYCODONE HYDROCHLORIDE 5 MILLIGRAM(S): 5 TABLET ORAL at 22:00

## 2017-07-29 RX ADMIN — Medication 1 TABLET(S): at 07:08

## 2017-07-29 RX ADMIN — Medication 1 MILLIGRAM(S): at 12:17

## 2017-07-29 RX ADMIN — Medication 200 MILLIGRAM(S): at 21:59

## 2017-07-29 RX ADMIN — OXYCODONE HYDROCHLORIDE 5 MILLIGRAM(S): 5 TABLET ORAL at 22:45

## 2017-07-29 RX ADMIN — Medication 100 MILLIGRAM(S): at 21:59

## 2017-07-29 RX ADMIN — FAMOTIDINE 20 MILLIGRAM(S): 10 INJECTION INTRAVENOUS at 18:22

## 2017-07-29 RX ADMIN — LOSARTAN POTASSIUM 100 MILLIGRAM(S): 100 TABLET, FILM COATED ORAL at 18:27

## 2017-07-29 NOTE — PROGRESS NOTE ADULT - SUBJECTIVE AND OBJECTIVE BOX
SUBJECTIVE: Patient seen and examined. Pt doing well o/n.  No f/c/n/v/cp/sob.     OBJECTIVE:  Vital Signs Last 24 Hrs  T(C): 36.4 (29 Jul 2017 05:45), Max: 38.6 (28 Jul 2017 15:13)  T(F): 97.5 (29 Jul 2017 05:45), Max: 101.4 (28 Jul 2017 15:13)  HR: 89 (29 Jul 2017 05:45) (89 - 96)  BP: 100/59 (29 Jul 2017 05:45) (100/59 - 123/61)  BP(mean): --  RR: 16 (29 Jul 2017 05:45) (16 - 16)  SpO2: 97% (29 Jul 2017 05:45) (96% - 98%)    Affected extremity:          Dressing: clean/dry/intact            Sensation: SILT         Motor exam: 5/5 TA/GS/EHL         warm well perfused; capillary refill <3 seconds     LABS:                        11.3   9.3   )-----------( 209      ( 27 Jul 2017 11:22 )             32.9     07-27    134<L>  |  97  |  12  ----------------------------<  110<H>  4.6   |  24  |  0.50    Ca    9.0      27 Jul 2017 11:22        Urinalysis Basic - ( 28 Jul 2017 13:06 )    Color: Yellow / Appearance: Clear / SG: <=1.005 / pH: x  Gluc: x / Ketone: NEGATIVE  / Bili: NEGATIVE / Urobili: 0.2 E.U./dL   Blood: x / Protein: NEGATIVE mg/dL / Nitrite: NEGATIVE   Leuk Esterase: Moderate / RBC: < 5 /HPF / WBC > 10 /HPF   Sq Epi: x / Non Sq Epi: Few /HPF / Bacteria: Present /HPF      MEDICATIONS:losartan 100 milliGRAM(s) Oral daily  atorvastatin 10 milliGRAM(s) Oral at bedtime  levothyroxine 75 MICROGram(s) Oral daily  lactated ringers. 1000 milliLiter(s) IV Continuous <Continuous>  aspirin enteric coated 325 milliGRAM(s) Oral two times a day  oxyCODONE    IR 5 milliGRAM(s) Oral every 4 hours PRN  oxyCODONE    IR 10 milliGRAM(s) Oral every 6 hours PRN  morphine  - Injectable 2 milliGRAM(s) IV Push every 4 hours PRN  aluminum hydroxide/magnesium hydroxide/simethicone Suspension 30 milliLiter(s) Oral four times a day PRN  ondansetron Injectable 4 milliGRAM(s) IV Push every 6 hours PRN  calcium carbonate 1250 mG + Vitamin D (OsCal 500 + D) 1 Tablet(s) Oral three times a day  famotidine    Tablet 20 milliGRAM(s) Oral every 12 hours  polyethylene glycol 3350 17 Gram(s) Oral daily  bisacodyl Suppository 10 milliGRAM(s) Rectal daily PRN  senna 2 Tablet(s) Oral at bedtime PRN  magnesium hydroxide Suspension 30 milliLiter(s) Oral daily PRN  docusate sodium 100 milliGRAM(s) Oral three times a day  ferrous    sulfate 325 milliGRAM(s) Oral three times a day with meals  folic acid 1 milliGRAM(s) Oral daily  multivitamin 1 Tablet(s) Oral daily  ascorbic acid 500 milliGRAM(s) Oral two times a day  acetaminophen   Tablet 650 milliGRAM(s) Oral every 6 hours PRN  phenytoin   Capsule 200 milliGRAM(s) Oral daily  sodium chloride 0.9%. 1000 milliLiter(s) IV Continuous <Continuous>    Anticoagulation:  aspirin enteric coated 325 milliGRAM(s) Oral two times a day    Antibiotics:     Pain medications:   oxyCODONE    IR 5 milliGRAM(s) Oral every 4 hours PRN  oxyCODONE    IR 10 milliGRAM(s) Oral every 6 hours PRN  morphine  - Injectable 2 milliGRAM(s) IV Push every 4 hours PRN  ondansetron Injectable 4 milliGRAM(s) IV Push every 6 hours PRN  acetaminophen   Tablet 650 milliGRAM(s) Oral every 6 hours PRN  phenytoin   Capsule 200 milliGRAM(s) Oral daily    A/P :  Pt is a 68yo Female s/p L YOVANNY Post 7/26/17 POD # 3  -    Pain control  -    DVT ppx: ASA     -    Weight bearing status: WBAT   -    Physical Therapy  -    Dispo: Home

## 2017-07-30 LAB
HCT VFR BLD CALC: 30.1 % — LOW (ref 34.5–45)
HGB BLD-MCNC: 9.6 G/DL — LOW (ref 11.5–15.5)
MCHC RBC-ENTMCNC: 30.8 PG — SIGNIFICANT CHANGE UP (ref 27–34)
MCHC RBC-ENTMCNC: 31.9 G/DL — LOW (ref 32–36)
MCV RBC AUTO: 96.5 FL — SIGNIFICANT CHANGE UP (ref 80–100)
PLATELET # BLD AUTO: 222 K/UL — SIGNIFICANT CHANGE UP (ref 150–400)
RBC # BLD: 3.12 M/UL — LOW (ref 3.8–5.2)
RBC # FLD: 12.2 % — SIGNIFICANT CHANGE UP (ref 10.3–16.9)
WBC # BLD: 7.4 K/UL — SIGNIFICANT CHANGE UP (ref 3.8–10.5)
WBC # FLD AUTO: 7.4 K/UL — SIGNIFICANT CHANGE UP (ref 3.8–10.5)

## 2017-07-30 RX ORDER — BACITRACIN ZINC 500 UNIT/G
1 OINTMENT IN PACKET (EA) TOPICAL
Qty: 0 | Refills: 0 | Status: DISCONTINUED | OUTPATIENT
Start: 2017-07-30 | End: 2017-07-31

## 2017-07-30 RX ADMIN — Medication 100 MILLIGRAM(S): at 21:35

## 2017-07-30 RX ADMIN — Medication 75 MICROGRAM(S): at 07:20

## 2017-07-30 RX ADMIN — ATORVASTATIN CALCIUM 10 MILLIGRAM(S): 80 TABLET, FILM COATED ORAL at 21:34

## 2017-07-30 RX ADMIN — FAMOTIDINE 20 MILLIGRAM(S): 10 INJECTION INTRAVENOUS at 07:13

## 2017-07-30 RX ADMIN — Medication 1 TABLET(S): at 07:13

## 2017-07-30 RX ADMIN — Medication 100 MILLIGRAM(S): at 07:13

## 2017-07-30 RX ADMIN — Medication 500 MILLIGRAM(S): at 07:12

## 2017-07-30 RX ADMIN — OXYCODONE HYDROCHLORIDE 5 MILLIGRAM(S): 5 TABLET ORAL at 09:38

## 2017-07-30 RX ADMIN — Medication 325 MILLIGRAM(S): at 18:03

## 2017-07-30 RX ADMIN — OXYCODONE HYDROCHLORIDE 5 MILLIGRAM(S): 5 TABLET ORAL at 09:16

## 2017-07-30 RX ADMIN — Medication 200 MILLIGRAM(S): at 21:37

## 2017-07-30 RX ADMIN — Medication 500 MILLIGRAM(S): at 18:03

## 2017-07-30 RX ADMIN — FAMOTIDINE 20 MILLIGRAM(S): 10 INJECTION INTRAVENOUS at 18:03

## 2017-07-30 RX ADMIN — Medication 325 MILLIGRAM(S): at 07:29

## 2017-07-30 RX ADMIN — Medication 325 MILLIGRAM(S): at 07:12

## 2017-07-30 RX ADMIN — Medication 1 TABLET(S): at 21:35

## 2017-07-30 NOTE — PROGRESS NOTE ADULT - SUBJECTIVE AND OBJECTIVE BOX
SUBJECTIVE: Patient seen and examined. Pt doing well o/n.  No f/c/n/v/cp/sob.     OBJECTIVE:  Vital Signs Last 24 Hrs  T(C): 36.8 (30 Jul 2017 05:58), Max: 37.1 (29 Jul 2017 21:00)  T(F): 98.3 (30 Jul 2017 05:58), Max: 98.8 (29 Jul 2017 21:00)  HR: 76 (30 Jul 2017 05:58) (76 - 97)  BP: 100/63 (30 Jul 2017 05:58) (100/63 - 116/69)  BP(mean): --  RR: 16 (30 Jul 2017 05:58) (15 - 18)  SpO2: 100% (30 Jul 2017 05:58) (99% - 100%)    Affected extremity:          Dressing: clean/dry/intact            Sensation: SILT         Motor exam: 5/5 TA/GS/EHL         warm well perfused; capillary refill <3 seconds     LABS:                        8.4    7.7   )-----------( 187      ( 29 Jul 2017 08:14 )             26.4     07-29    139  |  102  |  10  ----------------------------<  113<H>  3.7   |  28  |  0.50    Ca    8.8      29 Jul 2017 08:14        Urinalysis Basic - ( 28 Jul 2017 13:06 )    Color: Yellow / Appearance: Clear / SG: <=1.005 / pH: x  Gluc: x / Ketone: NEGATIVE  / Bili: NEGATIVE / Urobili: 0.2 E.U./dL   Blood: x / Protein: NEGATIVE mg/dL / Nitrite: NEGATIVE   Leuk Esterase: Moderate / RBC: < 5 /HPF / WBC > 10 /HPF   Sq Epi: x / Non Sq Epi: Few /HPF / Bacteria: Present /HPF      MEDICATIONS:losartan 100 milliGRAM(s) Oral daily  atorvastatin 10 milliGRAM(s) Oral at bedtime  levothyroxine 75 MICROGram(s) Oral daily  lactated ringers. 1000 milliLiter(s) IV Continuous <Continuous>  aspirin enteric coated 325 milliGRAM(s) Oral two times a day  oxyCODONE    IR 5 milliGRAM(s) Oral every 4 hours PRN  oxyCODONE    IR 10 milliGRAM(s) Oral every 6 hours PRN  morphine  - Injectable 2 milliGRAM(s) IV Push every 4 hours PRN  aluminum hydroxide/magnesium hydroxide/simethicone Suspension 30 milliLiter(s) Oral four times a day PRN  ondansetron Injectable 4 milliGRAM(s) IV Push every 6 hours PRN  calcium carbonate 1250 mG + Vitamin D (OsCal 500 + D) 1 Tablet(s) Oral three times a day  famotidine    Tablet 20 milliGRAM(s) Oral every 12 hours  polyethylene glycol 3350 17 Gram(s) Oral daily  bisacodyl Suppository 10 milliGRAM(s) Rectal daily PRN  senna 2 Tablet(s) Oral at bedtime PRN  magnesium hydroxide Suspension 30 milliLiter(s) Oral daily PRN  docusate sodium 100 milliGRAM(s) Oral three times a day  ferrous    sulfate 325 milliGRAM(s) Oral three times a day with meals  folic acid 1 milliGRAM(s) Oral daily  multivitamin 1 Tablet(s) Oral daily  ascorbic acid 500 milliGRAM(s) Oral two times a day  acetaminophen   Tablet 650 milliGRAM(s) Oral every 6 hours PRN  phenytoin   Capsule 200 milliGRAM(s) Oral daily  sodium chloride 0.9%. 1000 milliLiter(s) IV Continuous <Continuous>  ciprofloxacin     Tablet 500 milliGRAM(s) Oral every 12 hours    Anticoagulation:  aspirin enteric coated 325 milliGRAM(s) Oral two times a day    Antibiotics:   ciprofloxacin     Tablet 500 milliGRAM(s) Oral every 12 hours    Pain medications:   oxyCODONE    IR 5 milliGRAM(s) Oral every 4 hours PRN  oxyCODONE    IR 10 milliGRAM(s) Oral every 6 hours PRN  morphine  - Injectable 2 milliGRAM(s) IV Push every 4 hours PRN  ondansetron Injectable 4 milliGRAM(s) IV Push every 6 hours PRN  acetaminophen   Tablet 650 milliGRAM(s) Oral every 6 hours PRN  phenytoin   Capsule 200 milliGRAM(s) Oral daily    A/P :  Pt is a 68yo Female s/p L YOVANNY post 7/26/17  POD # 4  -    Pain control  -    DVT ppx: ASA     -    Weight bearing status: WBAT   -    Physical Therapy  -    Dispo: Home

## 2017-07-31 VITALS
OXYGEN SATURATION: 100 % | RESPIRATION RATE: 16 BRPM | TEMPERATURE: 98 F | SYSTOLIC BLOOD PRESSURE: 119 MMHG | DIASTOLIC BLOOD PRESSURE: 65 MMHG | HEART RATE: 91 BPM

## 2017-07-31 LAB
ANION GAP SERPL CALC-SCNC: 11 MMOL/L — SIGNIFICANT CHANGE UP (ref 5–17)
BUN SERPL-MCNC: 9 MG/DL — SIGNIFICANT CHANGE UP (ref 7–23)
CALCIUM SERPL-MCNC: 9 MG/DL — SIGNIFICANT CHANGE UP (ref 8.4–10.5)
CHLORIDE SERPL-SCNC: 100 MMOL/L — SIGNIFICANT CHANGE UP (ref 96–108)
CO2 SERPL-SCNC: 27 MMOL/L — SIGNIFICANT CHANGE UP (ref 22–31)
CREAT SERPL-MCNC: 0.5 MG/DL — SIGNIFICANT CHANGE UP (ref 0.5–1.3)
GLUCOSE SERPL-MCNC: 89 MG/DL — SIGNIFICANT CHANGE UP (ref 70–99)
HCT VFR BLD CALC: 27.9 % — LOW (ref 34.5–45)
HGB BLD-MCNC: 9.3 G/DL — LOW (ref 11.5–15.5)
MCHC RBC-ENTMCNC: 30.9 PG — SIGNIFICANT CHANGE UP (ref 27–34)
MCHC RBC-ENTMCNC: 33.3 G/DL — SIGNIFICANT CHANGE UP (ref 32–36)
MCV RBC AUTO: 92.7 FL — SIGNIFICANT CHANGE UP (ref 80–100)
PLATELET # BLD AUTO: 308 K/UL — SIGNIFICANT CHANGE UP (ref 150–400)
POTASSIUM SERPL-MCNC: 4 MMOL/L — SIGNIFICANT CHANGE UP (ref 3.5–5.3)
POTASSIUM SERPL-SCNC: 4 MMOL/L — SIGNIFICANT CHANGE UP (ref 3.5–5.3)
RBC # BLD: 3.01 M/UL — LOW (ref 3.8–5.2)
RBC # FLD: 12.8 % — SIGNIFICANT CHANGE UP (ref 10.3–16.9)
SODIUM SERPL-SCNC: 138 MMOL/L — SIGNIFICANT CHANGE UP (ref 135–145)
SURGICAL PATHOLOGY STUDY: SIGNIFICANT CHANGE UP
WBC # BLD: 5.3 K/UL — SIGNIFICANT CHANGE UP (ref 3.8–10.5)
WBC # FLD AUTO: 5.3 K/UL — SIGNIFICANT CHANGE UP (ref 3.8–10.5)

## 2017-07-31 PROCEDURE — 80048 BASIC METABOLIC PNL TOTAL CA: CPT

## 2017-07-31 PROCEDURE — 97161 PT EVAL LOW COMPLEX 20 MIN: CPT

## 2017-07-31 PROCEDURE — 81001 URINALYSIS AUTO W/SCOPE: CPT

## 2017-07-31 PROCEDURE — 88300 SURGICAL PATH GROSS: CPT

## 2017-07-31 PROCEDURE — 73502 X-RAY EXAM HIP UNI 2-3 VIEWS: CPT

## 2017-07-31 PROCEDURE — 85027 COMPLETE CBC AUTOMATED: CPT

## 2017-07-31 PROCEDURE — 36415 COLL VENOUS BLD VENIPUNCTURE: CPT

## 2017-07-31 PROCEDURE — 86901 BLOOD TYPING SEROLOGIC RH(D): CPT

## 2017-07-31 PROCEDURE — 82330 ASSAY OF CALCIUM: CPT

## 2017-07-31 PROCEDURE — 85018 HEMOGLOBIN: CPT

## 2017-07-31 PROCEDURE — 84132 ASSAY OF SERUM POTASSIUM: CPT

## 2017-07-31 PROCEDURE — 86923 COMPATIBILITY TEST ELECTRIC: CPT

## 2017-07-31 PROCEDURE — 84295 ASSAY OF SERUM SODIUM: CPT

## 2017-07-31 PROCEDURE — 97116 GAIT TRAINING THERAPY: CPT

## 2017-07-31 PROCEDURE — 97530 THERAPEUTIC ACTIVITIES: CPT

## 2017-07-31 PROCEDURE — 86900 BLOOD TYPING SEROLOGIC ABO: CPT

## 2017-07-31 PROCEDURE — 86850 RBC ANTIBODY SCREEN: CPT

## 2017-07-31 PROCEDURE — 87641 MR-STAPH DNA AMP PROBE: CPT

## 2017-07-31 PROCEDURE — 71045 X-RAY EXAM CHEST 1 VIEW: CPT

## 2017-07-31 PROCEDURE — C1713: CPT

## 2017-07-31 PROCEDURE — 87040 BLOOD CULTURE FOR BACTERIA: CPT

## 2017-07-31 PROCEDURE — 72170 X-RAY EXAM OF PELVIS: CPT

## 2017-07-31 PROCEDURE — C1776: CPT

## 2017-07-31 RX ORDER — CIPROFLOXACIN LACTATE 400MG/40ML
1 VIAL (ML) INTRAVENOUS
Qty: 2 | Refills: 0 | OUTPATIENT
Start: 2017-07-31 | End: 2017-08-01

## 2017-07-31 RX ORDER — GLUCOSAMINE/MSM/CHONDROITIN A 750-375MG
0 TABLET ORAL
Qty: 0 | Refills: 0 | COMMUNITY

## 2017-07-31 RX ORDER — FOLIC ACID 0.8 MG
0 TABLET ORAL
Qty: 0 | Refills: 0 | COMMUNITY

## 2017-07-31 RX ORDER — OXYCODONE HYDROCHLORIDE 5 MG/1
2 TABLET ORAL
Qty: 42 | Refills: 0 | OUTPATIENT
Start: 2017-07-31

## 2017-07-31 RX ORDER — ASPIRIN/CALCIUM CARB/MAGNESIUM 324 MG
1 TABLET ORAL
Qty: 0 | Refills: 0 | COMMUNITY

## 2017-07-31 RX ADMIN — Medication 1 TABLET(S): at 06:04

## 2017-07-31 RX ADMIN — Medication 500 MILLIGRAM(S): at 06:04

## 2017-07-31 RX ADMIN — OXYCODONE HYDROCHLORIDE 5 MILLIGRAM(S): 5 TABLET ORAL at 01:19

## 2017-07-31 RX ADMIN — LOSARTAN POTASSIUM 100 MILLIGRAM(S): 100 TABLET, FILM COATED ORAL at 06:04

## 2017-07-31 RX ADMIN — Medication 1 TABLET(S): at 12:28

## 2017-07-31 RX ADMIN — Medication 325 MILLIGRAM(S): at 12:28

## 2017-07-31 RX ADMIN — Medication 325 MILLIGRAM(S): at 06:04

## 2017-07-31 RX ADMIN — Medication 1 TABLET(S): at 13:09

## 2017-07-31 RX ADMIN — OXYCODONE HYDROCHLORIDE 5 MILLIGRAM(S): 5 TABLET ORAL at 02:19

## 2017-07-31 RX ADMIN — Medication 75 MICROGRAM(S): at 06:04

## 2017-07-31 RX ADMIN — FAMOTIDINE 20 MILLIGRAM(S): 10 INJECTION INTRAVENOUS at 06:04

## 2017-07-31 RX ADMIN — Medication 1 MILLIGRAM(S): at 12:28

## 2017-07-31 NOTE — PROGRESS NOTE ADULT - SUBJECTIVE AND OBJECTIVE BOX
ORTHO NOTE    [x ] Pt seen/examined.  [x ] Pt without any complaints/in NAD.    [ ] Pt complains of:      ROS: [ ] Fever  [ ] Chills  [ ] CP [ ] SOB [ ] Dysnea  [ ] Palpitations [ ] Cough [ ] N/V/C/D [ ] Paresthia [ ] Other     [x ] ROS  otherwise negative    .    PHYSICAL EXAM:    Vital Signs Last 24 Hrs  T(C): 36.9 (31 Jul 2017 08:15), Max: 36.9 (31 Jul 2017 08:15)  T(F): 98.4 (31 Jul 2017 08:15), Max: 98.4 (31 Jul 2017 08:15)  HR: 86 (31 Jul 2017 08:15) (86 - 90)  BP: 99/65 (31 Jul 2017 08:15) (99/65 - 144/74)  BP(mean): --  RR: 15 (31 Jul 2017 08:15) (15 - 16)  SpO2: 98% (31 Jul 2017 08:15) (98% - 100%)    I&O's Detail    30 Jul 2017 07:01  -  31 Jul 2017 07:00  --------------------------------------------------------  IN:    Oral Fluid: 480 mL  Total IN: 480 mL    OUT:    Voided: 750 mL  Total OUT: 750 mL    Total NET: -270 mL      31 Jul 2017 07:01  -  31 Jul 2017 11:19  --------------------------------------------------------  IN:    Oral Fluid: 300 mL  Total IN: 300 mL    OUT:    Voided: 400 mL  Total OUT: 400 mL    Total NET: -100 mL           CAPILLARY BLOOD GLUCOSE                      Neuro: AAOX3    Lungs: CTA, IS demonstrated    CV:    ABD: soft, nontender    Ext: left posterior hip dsg cdi, L LE NVID strength 5/5, hip abductor pillow implemented    LABS                        9.6    7.4   )-----------( 222      ( 30 Jul 2017 09:11 )             30.1                                        [ ] Other Labs  [ ] None ordered            Please check or Hoh when present:  •  Heart Failure:    [ ] Acute        [ ]  Acute on Chronic        [ ] Chronic         [ ] Diastolic     [ ]  Combined    •  NAINA:     [ ] ATN        [ ]  Renal medullary necrosis       [ ]  Renal cortical necrosis                  [ ] Other pathological Lesion:  •  CKD:  [ ] Stage I   [ ] Stage II  [ ] Stage III    [ ]Stage IV   [ ]  CKD V   [ ]  Other/Unspecified:    •  Abdominal Nutritional Status:   [ ] Malnutrition-See Nutrition note    [ ] Cachexia   [ ]  Other        [ ] Supplement ordered:            [ ] Morbid Obesity: BMI >=40         ASSESSMENT/PLAN:      STATUS POST: L YOVANNY posterior approach   cipro   CONTINUE:          [ ] PT- wbat posterior hip precautions    [ ] DVT PPX- scd, ASA bid    [ ] Pain Mgt- po meds    [ ] Dispo plan- home pending PT clearance

## 2017-07-31 NOTE — PROGRESS NOTE ADULT - SUBJECTIVE AND OBJECTIVE BOX
SUBJECTIVE: Patient seen and examined.  No issues overnight. Pain well controlled. Denies CP/SOB    OBJECTIVE:     Vital Signs Last 24 Hrs  T(C): 36.6 (31 Jul 2017 15:13), Max: 36.9 (31 Jul 2017 08:15)  T(F): 97.9 (31 Jul 2017 15:13), Max: 98.4 (31 Jul 2017 08:15)  HR: 91 (31 Jul 2017 15:13) (86 - 91)  BP: 119/65 (31 Jul 2017 15:13) (99/65 - 144/74)  BP(mean): --  RR: 16 (31 Jul 2017 15:13) (15 - 16)  SpO2: 100% (31 Jul 2017 15:13) (98% - 100%)                   Dressing: clean/dry/intact            Motor exam:  TA 5/5 EHL 5/5 GSC 5/5          Sensation:   T SILT SPH SILT DP SILT         Vascular:  Warm/pink/well perfused             LABS:                        9.3    5.3   )-----------( 308      ( 31 Jul 2017 11:55 )             27.9     07-31    138  |  100  |  9   ----------------------------<  89  4.0   |  27  |  0.50    Ca    9.0      31 Jul 2017 11:55            MEDICATIONS:  MEDICATIONS  (STANDING):  losartan 100 milliGRAM(s) Oral daily  atorvastatin 10 milliGRAM(s) Oral at bedtime  levothyroxine 75 MICROGram(s) Oral daily  lactated ringers. 1000 milliLiter(s) (120 mL/Hr) IV Continuous <Continuous>  aspirin enteric coated 325 milliGRAM(s) Oral two times a day  calcium carbonate 1250 mG + Vitamin D (OsCal 500 + D) 1 Tablet(s) Oral three times a day  famotidine    Tablet 20 milliGRAM(s) Oral every 12 hours  polyethylene glycol 3350 17 Gram(s) Oral daily  docusate sodium 100 milliGRAM(s) Oral three times a day  ferrous    sulfate 325 milliGRAM(s) Oral three times a day with meals  folic acid 1 milliGRAM(s) Oral daily  multivitamin 1 Tablet(s) Oral daily  ascorbic acid 500 milliGRAM(s) Oral two times a day  phenytoin   Capsule 200 milliGRAM(s) Oral daily  sodium chloride 0.9%. 1000 milliLiter(s) (80 mL/Hr) IV Continuous <Continuous>  ciprofloxacin     Tablet 500 milliGRAM(s) Oral every 12 hours    MEDICATIONS  (PRN):  oxyCODONE    IR 5 milliGRAM(s) Oral every 4 hours PRN Moderate Pain (4 - 6)  oxyCODONE    IR 10 milliGRAM(s) Oral every 6 hours PRN Severe Pain (7 - 10)  morphine  - Injectable 2 milliGRAM(s) IV Push every 4 hours PRN Breakthrough pain unrelieved by PO meds  aluminum hydroxide/magnesium hydroxide/simethicone Suspension 30 milliLiter(s) Oral four times a day PRN Indigestion  ondansetron Injectable 4 milliGRAM(s) IV Push every 6 hours PRN Nausea and/or Vomiting  bisacodyl Suppository 10 milliGRAM(s) Rectal daily PRN If no bowel movement by POD#2  senna 2 Tablet(s) Oral at bedtime PRN Constipation  magnesium hydroxide Suspension 30 milliLiter(s) Oral daily PRN Constipation unrelieved by Senna  acetaminophen   Tablet 650 milliGRAM(s) Oral every 6 hours PRN For Mild pain or Temp over 38.3 C (100.94 F)  BACItracin   Ointment 1 Application(s) Topical two times a day PRN irritation      Anticoagulation:  aspirin enteric coated 325 milliGRAM(s) Oral two times a day      Antibiotics:   ciprofloxacin     Tablet 500 milliGRAM(s) Oral every 12 hours      Pain medications:   oxyCODONE    IR 5 milliGRAM(s) Oral every 4 hours PRN  oxyCODONE    IR 10 milliGRAM(s) Oral every 6 hours PRN  morphine  - Injectable 2 milliGRAM(s) IV Push every 4 hours PRN  ondansetron Injectable 4 milliGRAM(s) IV Push every 6 hours PRN  acetaminophen   Tablet 650 milliGRAM(s) Oral every 6 hours PRN  phenytoin   Capsule 200 milliGRAM(s) Oral daily        A/P : 67f   s/p   l purvi  -    Pain control  -    DVT ppx: aspirin enteric coated 325 milliGRAM(s) Oral two times a day  -    ADAT  -    Check AM labs  -    Weight bearing status:  wbat  -    Resume home meds  -    Physical Therapy  -    cont cipro for uti

## 2017-08-02 LAB
CULTURE RESULTS: SIGNIFICANT CHANGE UP
SPECIMEN SOURCE: SIGNIFICANT CHANGE UP

## 2017-08-03 DIAGNOSIS — E78.5 HYPERLIPIDEMIA, UNSPECIFIED: ICD-10-CM

## 2017-08-03 DIAGNOSIS — I10 ESSENTIAL (PRIMARY) HYPERTENSION: ICD-10-CM

## 2017-08-03 DIAGNOSIS — M25.552 PAIN IN LEFT HIP: ICD-10-CM

## 2017-08-03 DIAGNOSIS — M16.12 UNILATERAL PRIMARY OSTEOARTHRITIS, LEFT HIP: ICD-10-CM

## 2017-08-03 DIAGNOSIS — G40.909 EPILEPSY, UNSPECIFIED, NOT INTRACTABLE, WITHOUT STATUS EPILEPTICUS: ICD-10-CM

## 2018-04-30 ENCOUNTER — EMERGENCY (EMERGENCY)
Facility: HOSPITAL | Age: 68
LOS: 1 days | Discharge: HOME | End: 2018-04-30
Admitting: EMERGENCY MEDICINE
Payer: MEDICARE

## 2018-04-30 VITALS
HEART RATE: 72 BPM | SYSTOLIC BLOOD PRESSURE: 131 MMHG | WEIGHT: 154.32 LBS | OXYGEN SATURATION: 99 % | RESPIRATION RATE: 18 BRPM | DIASTOLIC BLOOD PRESSURE: 67 MMHG | TEMPERATURE: 98 F

## 2018-04-30 DIAGNOSIS — Z41.9 ENCOUNTER FOR PROCEDURE FOR PURPOSES OTHER THAN REMEDYING HEALTH STATE, UNSPECIFIED: Chronic | ICD-10-CM

## 2018-04-30 DIAGNOSIS — M25.562 PAIN IN LEFT KNEE: ICD-10-CM
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
## 2018-04-30 DIAGNOSIS — S80.02XA CONTUSION OF LEFT KNEE, INITIAL ENCOUNTER: ICD-10-CM

## 2018-04-30 DIAGNOSIS — Z79.899 OTHER LONG TERM (CURRENT) DRUG THERAPY: ICD-10-CM

## 2018-04-30 DIAGNOSIS — Z96.659 PRESENCE OF UNSPECIFIED ARTIFICIAL KNEE JOINT: Chronic | ICD-10-CM

## 2018-04-30 DIAGNOSIS — Z79.2 LONG TERM (CURRENT) USE OF ANTIBIOTICS: ICD-10-CM

## 2018-04-30 DIAGNOSIS — I10 ESSENTIAL (PRIMARY) HYPERTENSION: ICD-10-CM

## 2018-04-30 DIAGNOSIS — Z79.891 LONG TERM (CURRENT) USE OF OPIATE ANALGESIC: ICD-10-CM

## 2018-04-30 DIAGNOSIS — Z79.82 LONG TERM (CURRENT) USE OF ASPIRIN: ICD-10-CM
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
## 2018-04-30 PROBLEM — M19.90 UNSPECIFIED OSTEOARTHRITIS, UNSPECIFIED SITE: Chronic | Status: ACTIVE | Noted: 2017-07-25

## 2018-04-30 PROCEDURE — 99283 EMERGENCY DEPT VISIT LOW MDM: CPT

## 2018-04-30 PROCEDURE — 73562 X-RAY EXAM OF KNEE 3: CPT

## 2018-04-30 PROCEDURE — 73562 X-RAY EXAM OF KNEE 3: CPT | Mod: 26,LT

## 2018-04-30 RX ORDER — OXYCODONE HYDROCHLORIDE 5 MG/1
1 TABLET ORAL
Qty: 10 | Refills: 0 | OUTPATIENT
Start: 2018-04-30

## 2018-04-30 NOTE — ED PROVIDER NOTE - OBJECTIVE STATEMENT
69 y/o f presents c/o left knee pain after stumbling yesterday while walking up steps, hit knee on ground.  Pt stating has bruise to her knee, taking aleve with minimal improvement in pain.  Denies numbness/tingling, weakness of ext, all other ROS negative.

## 2018-04-30 NOTE — ED ADULT NURSE NOTE - OBJECTIVE STATEMENT
Patient arrived to ED via walk-in, AAOx3, in NAD, vitals stable, complaining of mechanical fall with left knee pain.  Patient denies any LOC, head trauma, numbness or tingling to effected extremity or any other complaints.  Will continue with plan of care.

## 2018-04-30 NOTE — ED PROVIDER NOTE - MEDICAL DECISION MAKING DETAILS
67 y/o f left knee pain s/p fall; ext NVI, xray neg, will d/c, recommend ice, elevation, pain meds, f/u ortho